# Patient Record
Sex: FEMALE | Race: OTHER
[De-identification: names, ages, dates, MRNs, and addresses within clinical notes are randomized per-mention and may not be internally consistent; named-entity substitution may affect disease eponyms.]

---

## 2019-08-24 ENCOUNTER — HOSPITAL ENCOUNTER (INPATIENT)
Dept: HOSPITAL 1 - ED | Age: 49
LOS: 2 days | Discharge: HOME | DRG: 760 | End: 2019-08-26
Attending: FAMILY MEDICINE | Admitting: FAMILY MEDICINE
Payer: SELF-PAY

## 2019-08-24 VITALS
WEIGHT: 144.31 LBS | BODY MASS INDEX: 29.09 KG/M2 | WEIGHT: 144.31 LBS | HEIGHT: 59 IN | HEIGHT: 59 IN | BODY MASS INDEX: 29.09 KG/M2

## 2019-08-24 DIAGNOSIS — D72.829: ICD-10-CM

## 2019-08-24 DIAGNOSIS — D25.9: Primary | ICD-10-CM

## 2019-08-24 DIAGNOSIS — D62: ICD-10-CM

## 2019-08-24 DIAGNOSIS — E87.6: ICD-10-CM

## 2019-08-24 DIAGNOSIS — N92.0: ICD-10-CM

## 2019-08-24 DIAGNOSIS — Z83.3: ICD-10-CM

## 2019-08-24 DIAGNOSIS — R00.0: ICD-10-CM

## 2019-08-24 DIAGNOSIS — Z82.49: ICD-10-CM

## 2019-08-24 LAB
ALBUMIN SERPL-MCNC: 3.5 G/DL (ref 3.4–5)
ALP SERPL-CCNC: 70 U/L (ref 46–116)
ALT SERPL-CCNC: 14 U/L (ref 14–59)
AST SERPL-CCNC: 10 U/L (ref 15–37)
BASOPHILS NFR BLD: 2 % (ref 0–2)
BILIRUB SERPL-MCNC: 0.6 MG/DL (ref 0.2–1)
BUN SERPL-MCNC: 14 MG/DL (ref 7–18)
CALCIUM SERPL-MCNC: 9.1 MG/DL (ref 8.5–10.1)
CHLORIDE SERPL-SCNC: 104 MMOL/L (ref 98–107)
CO2 SERPL-SCNC: 22.9 MMOL/L (ref 21–32)
CREAT SERPL-MCNC: 0.9 MG/DL (ref 0.6–1)
ERYTHROCYTE [DISTWIDTH] IN BLOOD BY AUTOMATED COUNT: 19.9 % (ref 11.5–14.5)
GFR SERPLBLD BASED ON 1.73 SQ M-ARVRAT: > 60 ML/MIN
GLUCOSE SERPL-MCNC: 126 MG/DL (ref 74–106)
LIPASE SERPL-CCNC: 65 IU/L (ref 73–393)
MICROSCOPIC UR-IMP: YES
OVALOCYTES BLD QL SMEAR: (no result)
PLATELET # BLD: 323 X10^3MCL (ref 130–400)
POTASSIUM SERPL-SCNC: 3.4 MMOL/L (ref 3.5–5.1)
PROT SERPL-MCNC: 6.9 G/DL (ref 6.4–8.2)
RBC # UR STRIP.AUTO: (no result) /UL
RBC MORPH BLD: (no result)
SODIUM SERPL-SCNC: 139 MMOL/L (ref 136–145)
UA SPECIFIC GRAVITY: 1.02 (ref 1–1.03)

## 2019-08-24 PROCEDURE — G0378 HOSPITAL OBSERVATION PER HR: HCPCS

## 2019-08-24 PROCEDURE — P9016 RBC LEUKOCYTES REDUCED: HCPCS

## 2019-08-24 NOTE — NUR
PT BIB SELF FOR C/O LLQ ABD PAIN X2 DAYS. +N/V/D. PT DENIES ANY CHANGE IN DIET
OR FOOD ALLERGIES. PT DENIES ANY PAIN WHILE URINATING OR HAVING A BM. PT IS A/O
X4. PT RESPS ARE E/U. PT CAN AMBULATE WITH STEADY GAIT. AWAITING MSE

## 2019-08-25 VITALS — DIASTOLIC BLOOD PRESSURE: 58 MMHG | SYSTOLIC BLOOD PRESSURE: 103 MMHG

## 2019-08-25 VITALS — SYSTOLIC BLOOD PRESSURE: 106 MMHG | DIASTOLIC BLOOD PRESSURE: 59 MMHG

## 2019-08-25 VITALS — SYSTOLIC BLOOD PRESSURE: 110 MMHG | DIASTOLIC BLOOD PRESSURE: 56 MMHG

## 2019-08-25 VITALS — SYSTOLIC BLOOD PRESSURE: 124 MMHG | DIASTOLIC BLOOD PRESSURE: 42 MMHG

## 2019-08-25 VITALS — DIASTOLIC BLOOD PRESSURE: 52 MMHG | SYSTOLIC BLOOD PRESSURE: 119 MMHG

## 2019-08-25 VITALS — SYSTOLIC BLOOD PRESSURE: 126 MMHG | DIASTOLIC BLOOD PRESSURE: 61 MMHG

## 2019-08-25 VITALS — DIASTOLIC BLOOD PRESSURE: 52 MMHG | SYSTOLIC BLOOD PRESSURE: 113 MMHG

## 2019-08-25 VITALS — SYSTOLIC BLOOD PRESSURE: 103 MMHG | DIASTOLIC BLOOD PRESSURE: 58 MMHG

## 2019-08-25 VITALS — DIASTOLIC BLOOD PRESSURE: 52 MMHG | SYSTOLIC BLOOD PRESSURE: 96 MMHG

## 2019-08-25 VITALS — SYSTOLIC BLOOD PRESSURE: 108 MMHG | DIASTOLIC BLOOD PRESSURE: 58 MMHG

## 2019-08-25 LAB
BASOPHILS NFR BLD: 0.4 % (ref 0–2)
BASOPHILS NFR BLD: 0.5 % (ref 0–2)
BUN SERPL-MCNC: 13 MG/DL (ref 7–18)
BURR CELLS BLD QL SMEAR: (no result)
CALCIUM SERPL-MCNC: 8.4 MG/DL (ref 8.5–10.1)
CHLORIDE SERPL-SCNC: 112 MMOL/L (ref 98–107)
CO2 SERPL-SCNC: 20.7 MMOL/L (ref 21–32)
CREAT SERPL-MCNC: 0.8 MG/DL (ref 0.6–1)
ERYTHROCYTE [DISTWIDTH] IN BLOOD BY AUTOMATED COUNT: 28.1 % (ref 11.5–14.5)
ERYTHROCYTE [DISTWIDTH] IN BLOOD BY AUTOMATED COUNT: 30.4 % (ref 11.5–14.5)
GFR SERPLBLD BASED ON 1.73 SQ M-ARVRAT: > 60 ML/MIN
GLUCOSE SERPL-MCNC: 90 MG/DL (ref 74–106)
MAGNESIUM SERPL-MCNC: 2.3 MG/DL (ref 1.8–2.4)
MAGNESIUM SERPL-MCNC: 2.4 MG/DL (ref 1.8–2.4)
OVALOCYTES BLD QL SMEAR: (no result)
OVALOCYTES BLD QL SMEAR: (no result)
PHOSPHATE SERPL-MCNC: 3.1 MG/DL (ref 2.5–4.9)
PHOSPHATE SERPL-MCNC: 3.6 MG/DL (ref 2.5–4.9)
PLATELET # BLD: 265 X10^3MCL (ref 130–400)
PLATELET # BLD: 279 X10^3MCL (ref 130–400)
POTASSIUM SERPL-SCNC: 4 MMOL/L (ref 3.5–5.1)
RBC MORPH BLD: (no result)
RBC MORPH BLD: (no result)
SCHISTOCYTES BLD QL SMEAR: (no result)
SCHISTOCYTES BLD QL SMEAR: (no result)
SODIUM SERPL-SCNC: 144 MMOL/L (ref 136–145)

## 2019-08-25 PROCEDURE — 30233N1 TRANSFUSION OF NONAUTOLOGOUS RED BLOOD CELLS INTO PERIPHERAL VEIN, PERCUTANEOUS APPROACH: ICD-10-PCS | Performed by: EMERGENCY MEDICINE

## 2019-08-25 NOTE — NUR
FIRST UNIT OF BLOOD TRANSFUSION COMPLETED AT THIS TIME. /58 HR 72. PER
DR CROW TO HOLD LASIX. NO SIGNS OF ADVERSE REACTION NOTED. CALL BUTTON
WITHIN REACH. SAFETY PRECAUTIONS IN PLACE. WILL CONTINUE TO MONITOR.

## 2019-08-25 NOTE — NUR
TYLENOL 650 MG PO GIVEN FOR CRAMPING ABDOMINAL PAIN 4/10. PT TURNED AND
REPOSITIONED FOR COMFORT. CALL LIGHT WITHIN REACH. BED IN LOWEST POSTION.

## 2019-08-25 NOTE — NUR
SECOND UNIT OF BLOOD INITIATED AT THIS TIME PER ORDER. VERIFIED BLOOD WITH
SECOND RN, BRIANNA CURRY. PT MADE AWARE OF POSSIBLE ADVERSE REACTIONS, VERBALIZED
UNDERSTANDING. VS STABLE. CALL BUTTON WITHIN REACH. SAFETY PRECAUTIONS IN
PLACE. WILL CONTINUE TO MONITOR.

## 2019-08-25 NOTE — NUR
PT RESTING. NO SIGNS OF DISTRESS. BLOOD TRANSFUSING WELL. NO SIGNS OF
INFILTRATION. NO SIGNS OF ADVERSE REACTION NOTED. CALL BUTTON WITHIN REACH.
SAFETY PRECAUTIONS IN PLACE. WILL CONTINUE TO MONITOR.

## 2019-08-25 NOTE — NUR
PT IS AAOX4. PT DENIES H/A AND DIZZINESS.  RESP EVEN AND UNLABORED. LUNG
SOUNDS CTA. ON R/A. TELE 5 IN PLACE READING NSR. ABDOMEN SOFT, NONTENDER,
NONDISTENDED. BOWEL SOUNDS ACTIVE X 4 QUADS. PT DENIES N/V/D. SKIN CDI. NO
EDEMA NOTED. PERIPHERAL PULSES MODERATELY PALPABLE. DENIES NUMBNESS AND
TINGLING. IVF (BLOOD) RUNNING TO LAC. SITE WNL. NO S/S OF INFECTION OR
INFILTRATION. WILL CONTINUE MONITORING PT'S MENSTRUAL FLOW. PT SOAKED THREW 8
PADS WITH SEROSANGUINOUS BLOOD DURING NOC SHIFT. PT DENIES PAIN AT THIS TIME.
CALL LIGHT WITHIN REACH.

## 2019-08-25 NOTE — NUR
PT IS SLEEPING BUT EASILY AROUSABLE TO VERBAL STIMULI. RESP EVEN AND
UNLABORED. NO DISTRESS NOTED. CALL LIGHT WITHIN REACH.

## 2019-08-25 NOTE — NUR
PT REPORTED ABD PAIN 5/10. MEDICATED PER EMAR. CALL BUTTON WITHIN REACH.
SAFETY PRECAUTIONS IN PLACE. WILL CONTINUE TO MONITOR.

## 2019-08-25 NOTE — NUR
REPORTED TO DR. HAYNES THAT PT'S HGB 6.2, HCT 20. REPORTED THAT THE CBC WAS
DRAWN WHILE THE SECOND UNIT OF PRBC WAS INFUSING. DR. HAYNES STATED HE WILL
ASSESS THE PT AND MOST LIKELY ORDER ANOTHER CBC. PT MADE AWARE.

## 2019-08-25 NOTE — NUR
PT SLEPT ON AND OFF THROUGHOUT THE SHIFT. BREATHING EVEN AND UNLABORED ON RA.
NO SOB NOTED. BLOOD TRANSFUSION CONTINUE TO INFUSE AT THIS TIME. NO ADVERSE
REACTIONS. PT AMBULATORY WITH MINIMAL ASSIST. PER PT ON MENSTRAUL CYCLE, HEAVY
FLOW. PT MEDICATED PER EMAR. ENCOURAGE PT TO USE BEDPAN AS PT GETS DIZZY WHEN
STANDINING UP AT TIMES. DAUGHTER AT BEDSIDE. CALL BUTTON WITHIN REACH. SAFETY
PRECAUTIONS IN PLACE. WILL CONITNUE TO MONITOR AND ENDORSE CARE TO DAY SHIFT
RN.

## 2019-08-25 NOTE — NUR
PT IS SITTING UP IN AT BESIDE VISITING WITH DAUGHTER. RESP EVEN AND UNLABORED.
NO DISTRESS NOTED. DENIES H/A, PAIN AND DISCOMFORT AT THIS TIME. PT USED 2
PERIPADS THIS SHIFT WITH LIGHT SEROSANGUINOUS DISCHARGE. CALL LIGHT WITHIN
REACH. BED IN LOWEST POSITION. WILL ENDORSE ALL CARE TO NOC RN.

## 2019-08-25 NOTE — NUR
POST 15 MIN OF BLOOD TRANSFUSING, VS STABLE. NO SIGNS OF REACTION. BLOOD
INFUISNG AT 100ML/HR. CALL BUTTON WITHIN REACH. SAFETY PRECAUTIONS IN PLACE.
WILL CONTINUE TO MONITOR.

## 2019-08-25 NOTE — NUR
POST 15 MIN OF BLOOD TANSFUSING, PT VS STABLE. NO SIGNS OF ADVERSE REACTIONS.
TRANSFUSING  ML/HR. NO SIGNS OF DISTRESS. CALL BUTTON WITHIN REACH.
SAFETY PRECAUTIONS IN PLACE. WILL CONTINUE TO MONITOR.

## 2019-08-25 NOTE — NUR
SECOND UNIT OF PRBC COMPLETED. PT DENIES FEVER, CHILLS, FLANK PAIN, ITCHING
AND SOB. PT STATED, "I AM FEELING SO MUCH BETTER." PT AMBULATED TO BATHROOM
AND BACK IN BED WITH NO C/O DIZZINESS OR WEAKNESS. VS: T 98.6F, HT 74, RR 20
B/P 111/52, 02 SAT AT 98% ON R/A. PT IN BACK IN BED. CALL LIGHT WITHIN REACH.
BED IN LOWEST POSTION. DAUGHTER AT BEDSIDE.

## 2019-08-25 NOTE — NUR
PT IS VISITING WITH DAUGHTER. PT STATED SHE HAS ONLY HAD ON PAD CX SINCE THIS
MORNING. RESP EVEN AND UNLABORED. NO DISTRESS NOTED. DENIES PAIN AT THIS TIME.
CALL LIGHT WITHIN REACH.

## 2019-08-25 NOTE — NUR
RECEIVED PT FROM ED VIA [facility]ERNEY, CAME IN DUE TO LEFT ABDOMINAL PAIN. PT IS
AAOX4. C/O DIZZINESS. C/O MILD SOB ON MINIMAL EXERTION AND 4/10 MID CHEST PAIN
ONLY WHEN TAKING A DEEP BREATH. O2 SAT=98% RA. SR ON THE MONITOR. PALE. PULSES
ARE PALPABLE. PT IS ON HER MENSTRUAL PERIOD, USUALLY LASTS 5 DAYS. HER PERIOD
STARTED ON 8/22/19. STATED THAT HER MENSTRUAL PERIOD IS HEAVY AND THIS IS HER
8TH PAD ALEADY. IV SITE ON THE LAC IS PATENT AND INTACT. SIDE RAILS UPX2. CALL
LIGHT ON REACH. DAUGHTER AT BEDSIDE. ENDORSED TO PRIMARY NURSE RICKIE FOR
CONTINUITY OF CARE

## 2019-08-25 NOTE — NUR
TYLENOL 650MG PO GIVEN FOR MILD H/A 3/10. EXTRA FLUIDS GIVEN AND ENCOURAGED.
IVF REPLENISHED. RESP EVEN AND UNLABORED. NO DISTRESS NOTED. PT STATES SHE HAD
2 PAD CHANGES WITH SEROSANGUINOUS DISCHARGE AND NO CLOTS THIS SHIFT. CALL
LIGHT WITHIN REACH.

## 2019-08-25 NOTE — NUR
PT AWAKE DENIES ANY PAIN. NO SIGNS OF DISTRESS. BLOOD TRANSFUSION CONTINUE TO
INFUSE  ML/HR, NO SIGNS OF INFILTRATION. ENDORSED CARE TO DAY SHIFT RN,
ALL QUESTIONS ADDRESSED.

## 2019-08-25 NOTE — NUR
RECEIVED PT FROM DAY SHIFT RN. PT AAOX4. DENIES HA/DIZZINESS. BREATHING EVEN
AND UNLABORED ON RA WITH NO SOB NOTED. IV LAC PATENT, INFUSING WELL. TELE #5.
DENIES CHEST PAIN/PRESSURE. ABD SOFT/ROUND ACTIVE BOWEL SOUNDS. DENIES ABD
PAIN/N/V. PER PT SHE CHANGED HER FEMENINE PADS X2 DURING THE DAY. PT
AMBULATORY WITH BRP. NO SIGNS OF DISTRESS. CALL BUTTON WITHIN REACH. SAFETY
PRECAUTIONS IN PLACE. DAUGHTER AT BEDSIDE. WILL CONTINUE TO MONITOR.

## 2019-08-25 NOTE — NUR
BLOOD TRANSFUSION INITATED AS ORDERED, VERIFIED WITH SECOND RNBRIANNA. PT PRE
MEDICATED AS ORDERED. VS STABLE. PT EDUCATED ON SIDE EFFECTS OF TRANSFUSION
REACTIONS, PT VERBALIZED UNDERSTANDING. CONSENT SIGNED AND PLACED IN CHART.
FIRST UNIT PRBC TRANSFUSING VIA LAC 20G, INFUSING WELL. NO S/S OF INFILTRATION
NOTED.

## 2019-08-26 VITALS — SYSTOLIC BLOOD PRESSURE: 114 MMHG | DIASTOLIC BLOOD PRESSURE: 57 MMHG

## 2019-08-26 VITALS — DIASTOLIC BLOOD PRESSURE: 65 MMHG | SYSTOLIC BLOOD PRESSURE: 125 MMHG

## 2019-08-26 VITALS — DIASTOLIC BLOOD PRESSURE: 57 MMHG | SYSTOLIC BLOOD PRESSURE: 114 MMHG

## 2019-08-26 LAB
BASOPHILS NFR BLD: 0.3 % (ref 0–2)
BUN SERPL-MCNC: 10 MG/DL (ref 7–18)
CALCIUM SERPL-MCNC: 8.7 MG/DL (ref 8.5–10.1)
CHLORIDE SERPL-SCNC: 107 MMOL/L (ref 98–107)
CO2 SERPL-SCNC: 22.9 MMOL/L (ref 21–32)
CREAT SERPL-MCNC: 0.7 MG/DL (ref 0.6–1)
DACRYOCYTES BLD QL SMEAR: (no result)
ERYTHROCYTE [DISTWIDTH] IN BLOOD BY AUTOMATED COUNT: 30.6 % (ref 11.5–14.5)
GFR SERPLBLD BASED ON 1.73 SQ M-ARVRAT: > 60 ML/MIN
GLUCOSE SERPL-MCNC: 76 MG/DL (ref 74–106)
MAGNESIUM SERPL-MCNC: 2.3 MG/DL (ref 1.8–2.4)
OVALOCYTES BLD QL SMEAR: (no result)
PHOSPHATE SERPL-MCNC: 3 MG/DL (ref 2.5–4.9)
PLATELET # BLD: 265 X10^3MCL (ref 130–400)
POTASSIUM SERPL-SCNC: 3.5 MMOL/L (ref 3.5–5.1)
RBC MORPH BLD: (no result)
SCHISTOCYTES BLD QL SMEAR: (no result)
SODIUM SERPL-SCNC: 141 MMOL/L (ref 136–145)

## 2019-08-26 NOTE — NUR
PT RESTING. BREATHING EVEN AND UNLABORED WITH NO SIGNS OF DISTRESS. CALL
BUTTON WITHIN REACH. SAFETY PRECAUTIONS IN PLACE. WILL CONITNUE TO MONITOR.

## 2019-08-26 NOTE — NUR
SHIFT ASSESSMENT DONE. PATIENT A/A/OX4. TELE#5; SR; HR 78. DENIED RESP
DISTRESS ON RA. AMBULATED ON STEADY GAIT. IVF OF NS 100CC/HR; IV SITE TO LAC
INTACT. DENIED ABD PAIN. SPOT VAG BLEEDING TO PAD. NO MAXI PAD CHANGED TODAY
YET. TOLERATED REGULAR DIET BREAKFAST. CALL LIGHT IN REACH.

## 2019-08-26 NOTE — NUR
PT SLEPT MOST OF THE NIGHT WITH NO SIGNS OF DISTRESS. BREATHING EVEN AND
UNLABORED ON RA WITH NO RESP DISTRESS. IV PATENT, INFUSING WELL. NO SIGNS OF
INFILTRATION NOTED. PT AMBULATORY WITH BRP. PER PT CHANGED FEMENINE PADS X1
TONIGHT VERY MINIMAL BLEEDING. PT REPORTED ABD PAIN X1, MEDICATED PER EMAR.
CALL BUTTON WITHIN REACH. SAFETY PRECAUTIONS IN PLACE. WILL CONTINUE TO
MONITOR AND ENDORSE CARE TO DAY SHIFT RN.

## 2019-08-26 NOTE — NUR
ROUNDS MADE. PT RESTING. BREATHING EVEN AND UNLABORED WITH NO SIGNS OF
DISTRESS. CALL BUTTON WITHIN REACH. SAFETY PRECAUTIONS IN PLACE. WILL CONTINUE
TO MONITOR.